# Patient Record
Sex: FEMALE | Race: WHITE | NOT HISPANIC OR LATINO | Employment: UNEMPLOYED | URBAN - METROPOLITAN AREA
[De-identification: names, ages, dates, MRNs, and addresses within clinical notes are randomized per-mention and may not be internally consistent; named-entity substitution may affect disease eponyms.]

---
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
## 2025-02-10 ENCOUNTER — TELEPHONE (OUTPATIENT)
Dept: PEDIATRIC CARDIOLOGY | Facility: CLINIC | Age: 20
End: 2025-02-10
Payer: COMMERCIAL

## 2025-02-11 ENCOUNTER — TELEPHONE (OUTPATIENT)
Dept: PEDIATRIC CARDIOLOGY | Facility: CLINIC | Age: 20
End: 2025-02-11
Payer: COMMERCIAL

## 2025-02-11 RX ORDER — LOSARTAN POTASSIUM 25 MG/1
25 TABLET ORAL DAILY
Qty: 90 TABLET | Refills: 3 | Status: SHIPPED | OUTPATIENT
Start: 2025-02-11 | End: 2026-02-11

## 2025-03-17 NOTE — PROGRESS NOTES
[facility]           Steffany Ctoter    2005   /74 (BP Location: Right arm, Patient Position: Sitting)   Pulse 74   Resp (!) 24   Ht 5' (1.524 m)   Wt 66.7 kg (146 lb 15 oz)   SpO2 97%   BMI 28.70 kg/m²       Indications: Mireles syndrome, BAV dilated root AI.  SVT S/P RFA(9/15/23)     M mode: normal atrial and ventricular dimensions.  LV wall dimensions and FS are normal.  No effusion seen  ALEXA not appreciated.         2D: Normal situs, Levocardia, atrial and ventricular concordance  and normal position of great vessels(S,D,N).    The IVC and SVC are normal.    There is no evidence for a persistent LSVC.   Great Vessels are normally related.   The aortic valve is bicuspid.    The pulmonary valve is anterior and normal appearing without bowing or thickening. The branch pulmonary arteries are confluent and well formed.  The tricuspid valve appears normal with no Ebstein or other malformations.  The mitral valve is not dysplastic and there is no gross prolapse in multiple views.   The atrial septum appears intact by 2D imaging.   The ventricular septum appears intact.  The right ventricle is not enlarged and appears to have normal systolic wall motion.  The left ventricle appears of normal dimensions and normal wall motion with no septal or segmental abnormalities.  The proximal left coronary artery appears normal including the LAD.  The right coronary anatomy appears of normal dimensions and location.  The aortic arch appears left sided with normal head and neck branching and no findings concerning for a discrete coarctation. There is no effusion.       Color, PW and CW Doppler:  Normal IVC and SVC flow.  The atrial septum appears intact by color imaging.  At least one pulmonary vein was seen on each side with normal unobstructed insertion into  the posterior left atrium.     The ventricular septum is intact. The tricuspid valve function appears normal with normal septal attachment  and no significant insufficiency and no stenosis.  The mitral valve function is normal with no insufficiency or stenosis.  There is no significant pulmonary insufficiency.  There is no stenosis at the pulmonary valve, subvalvular or supravalvular level.  There is no significant stenosis at the bilateral branch pulmonary arteries.  The aortic valve is bicuspid with hinge points at 3 and 9 with mild plus AI P 1/2 time 510 ms, 4.  mm diam at orifice.  The AI is concentric.  There is no AS with peak tavo 1.6 m/s.  There is mild aortic sinus(34 mm), and ascending aorta (36  mm) enlargement.   There is no sub or supraAS.  The doppler assessment was from multiple views.    Diastolic flow was seen into the LCA.  Aortic arch doppler profiles are normal with no findings concerning for a discrete coarctation.      Impression:  non dysplastic bicuspid aortic valve with mild plus stable insufficiency with mild LVE.  Mild plus stable aortic sinus and ascending aorta dilation.  No aortic stenosis or coarctation.         BRIT Reyes MD

## 2025-03-17 NOTE — PROGRESS NOTES
[facility]  [address]  [place] [address]  Outreach in [place] [facility]     Fax        Dear Dr. Kruger,                                     Re:Steffany Mi Cyndee,  2005      HPI:  I again had the pleasure of seeing  Steffany in my pediatric cardiology clinic today for a  nine    month  follow up.  She was started on  Losartan 25 mg daily in 2023 for mildly increasing mildly dilated aortic root and aortic insufficiency.  She has been compliant and is tolerating it well.  A CT Angio following her last visit in 2024 revealed a mildly dilated sinus max diameter of 35 mm and otherwise normal aortic arch.     To summarize, she has a history of SVT(AVNRT) status post ablation,  Mireles syndrome, a bicuspid aortic valve and mild plus aortic   insufficiency and mild aortic root enlargement.   During  prior  echo, her root diameter had increased from 35 to 37.  mm.      Her SVT  was treated with Metoprolol and she subsequently  underwent an EP study and cryoablation(Dr. [facility]) 9/15/23 without complications.  She is at [employer] and is doing well.          She is no longer taking growth hormone so is not taking any   other  medications at this time and has no known drug allergies.  She had an  ENT  polyp removed last year but has been healthy.  The family decided against  egg harvesting which can preserve fertility in her syndrome.      She had a tonsillectomy and esotropia surgery around age [age]. She had a cochlear implant  four plus years ago without complications and is followed by ENT and speech therapy for this.    Her medical history is otherwise  unremarkable regarding asthma, GI, , infectious, orthopedic, psychiatric or neurological abnormalities.     Steffany   was a term product of an uncomplicated pregnancy.   Her family history is unremarkable regarding congenital heart defects,  "sudden deaths in relatives under the age of forty, dysrhythmias.     During her last visit:   Echo:Bicuspid aortic valve(hinge 3 and 9) with no stenosis(1.6 m/s), mild plus insufficiency(4 mm orifice-P 1/2 time 520 ms) with mildly globular LV with mild   left ventricular enlargement and mild increased  aortic root dilatation(max diameter  37 mm ascending root).           Vitals: /74 (BP Location: Right arm, Patient Position: Sitting)   Pulse 74   Resp (!) 24   Ht 5' (1.524 m)   Wt 66.7 kg (146 lb 15 oz)   SpO2 97%   BMI 28.70 kg/m²    General" WNWD pleasant interactive young woman.     Chest: No pectus deformities(mild shield chest), her breath sounds are unlabored and clear to auscultation.  CVS:   Quiet precordium with a regular resting heart rate in the 70s, normal S1, S2 with a systolic click at her LMSB and 1-2/6 JD at her LLSB to RSB.  No diastolic murmur was appreciated despite positioning. Her central perfusion is normal. Her heart rate increased to the 90s when standing with no dizziness reported.   Abdomen:  Soft, non tender, with no hepatosplenomegaly.    Extremities: normal central and  distal pulses and perfusion without delays.   Skin: No rash or lesions  Neuro: non focal exam.    EKG: Normal sinus rhythm with a heart rate of  65 BPM, no pre-excitation.  Echo:Bicuspid aortic valve(hinge 3 and 9) without thickening,  no stenosis, mild plus insufficiency(4 mm diam orifice-P 1/2 time 500 ms) with mildly globular LV with mild   left ventricular enlargement and stable  aortic root dilatation(max diameter  36 mm ascending root).       In summary, Steffany is tolerating her Losartan well.    Her dimensions today are stable over the last year and her CT from nine  months ago.  Her aortic insufficiency is mild to moderate and stable.  I reassured Steffany(and her Mother by phone) regarding the stab  cardiac findings today.    She underwent a successful ablation of SVT 18 months ago with no recurrence.  " Given the stability, I am spacing out her follow up to one year, sooner for any cardiac concerns.    There is no cardiac increased risk for anesthesia or minor surgical procedures.        Activity restrictions(other than power lifting), and SBE  prophylaxis   are not necessary.      Please let me know if I can be of any assistance in the interim.         Sincerely,  Electronically signed.   BRIT Reyes MD, FACC

## 2025-03-18 ENCOUNTER — OFFICE VISIT (OUTPATIENT)
Dept: PEDIATRIC CARDIOLOGY | Facility: CLINIC | Age: 20
End: 2025-03-18
Payer: COMMERCIAL

## 2025-03-18 ENCOUNTER — CLINICAL SUPPORT (OUTPATIENT)
Dept: PEDIATRIC CARDIOLOGY | Facility: CLINIC | Age: 20
End: 2025-03-18
Attending: PEDIATRICS
Payer: COMMERCIAL

## 2025-03-18 VITALS
DIASTOLIC BLOOD PRESSURE: 74 MMHG | BODY MASS INDEX: 28.85 KG/M2 | RESPIRATION RATE: 24 BRPM | OXYGEN SATURATION: 97 % | WEIGHT: 146.94 LBS | HEIGHT: 60 IN | SYSTOLIC BLOOD PRESSURE: 112 MMHG | HEART RATE: 74 BPM

## 2025-03-18 DIAGNOSIS — I77.810 DILATED AORTIC ROOT: ICD-10-CM

## 2025-03-18 DIAGNOSIS — Q23.81 BICUSPID AORTIC VALVE: ICD-10-CM

## 2025-03-18 DIAGNOSIS — I35.1 NONRHEUMATIC AORTIC VALVE INSUFFICIENCY: Primary | ICD-10-CM

## 2025-03-18 DIAGNOSIS — Q23.81 BICUSPID AORTIC VALVE: Primary | ICD-10-CM

## 2025-03-18 LAB — BSA FOR ECHO PROCEDURE: 1.68 M2

## 2025-03-18 PROCEDURE — 1159F MED LIST DOCD IN RCRD: CPT | Mod: S$GLB,,, | Performed by: PEDIATRICS

## 2025-03-18 PROCEDURE — 4010F ACE/ARB THERAPY RXD/TAKEN: CPT | Mod: S$GLB,,, | Performed by: PEDIATRICS

## 2025-03-18 PROCEDURE — 93320 DOPPLER ECHO COMPLETE: CPT | Mod: S$GLB,,, | Performed by: PEDIATRICS

## 2025-03-18 PROCEDURE — 3078F DIAST BP <80 MM HG: CPT | Mod: S$GLB,,, | Performed by: PEDIATRICS

## 2025-03-18 PROCEDURE — 3074F SYST BP LT 130 MM HG: CPT | Mod: S$GLB,,, | Performed by: PEDIATRICS

## 2025-03-18 PROCEDURE — 93303 ECHO TRANSTHORACIC: CPT | Mod: S$GLB,,, | Performed by: PEDIATRICS

## 2025-03-18 PROCEDURE — 93325 DOPPLER ECHO COLOR FLOW MAPG: CPT | Mod: S$GLB,,, | Performed by: PEDIATRICS

## 2025-03-18 PROCEDURE — 3008F BODY MASS INDEX DOCD: CPT | Mod: S$GLB,,, | Performed by: PEDIATRICS

## 2025-03-18 PROCEDURE — 99215 OFFICE O/P EST HI 40 MIN: CPT | Mod: S$GLB,,, | Performed by: PEDIATRICS

## 2025-06-18 ENCOUNTER — TELEPHONE (OUTPATIENT)
Dept: PEDIATRIC CARDIOLOGY | Facility: CLINIC | Age: 20
End: 2025-06-18
Payer: COMMERCIAL

## 2025-06-18 NOTE — TELEPHONE ENCOUNTER
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 If so she would like a note stating so

## 2025-06-23 ENCOUNTER — TELEPHONE (OUTPATIENT)
Dept: PEDIATRIC CARDIOLOGY | Facility: CLINIC | Age: 20
End: 2025-06-23
Payer: COMMERCIAL

## 2025-06-23 NOTE — TELEPHONE ENCOUNTER
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  I generally  against power lifting in patients with a bicuspid aortic valve.  I suggested focusing on lifting patient techniques which minimize strain on back etc

## 2025-06-30 ENCOUNTER — TELEPHONE (OUTPATIENT)
Dept: PEDIATRIC CARDIOLOGY | Facility: CLINIC | Age: 20
End: 2025-06-30
Payer: COMMERCIAL

## 2025-06-30 NOTE — TELEPHONE ENCOUNTER
Steffany's Mom called requesting a letter stating pt has no limitations for the school she is attending. It was requested on the physical she took. Wants letter mailed to pt

## (undated) DEVICE — PACK EP DRAPE OMC

## (undated) DEVICE — INTRODUCER HEMOSTASIS 6.5FR

## (undated) DEVICE — KIT ENSITE ELECTRODE SURFACE

## (undated) DEVICE — R CATH QPLR HIS CURV 5FRX110CM

## (undated) DEVICE — CATH SURG CRYOABL XTRA3 MD CV

## (undated) DEVICE — CABLE COAXIAL UMBILICAL

## (undated) DEVICE — INTRO 8.5FR 63CM SRO

## (undated) DEVICE — R CATH QUADRAPOLAR 6FRX110CM

## (undated) DEVICE — R CATH QUADRIPOLAR 5FRX120CM

## (undated) DEVICE — INTRODUCER HEMOSTASIS 7.5F

## (undated) DEVICE — INTRODUCER HEMOSTASIS 5.5FR

## (undated) DEVICE — R CATH POLARIS X 6FR 105CM

## (undated) DEVICE — CABLE ELECTRICAL UMBILICAL

## (undated) DEVICE — KIT PROBE COVER WITH GEL

## (undated) DEVICE — PAD GROUND UNIV STYLE CORD 9IN

## (undated) DEVICE — PAD DEFIB CADENCE ADULT R2